# Patient Record
Sex: FEMALE | Race: WHITE | URBAN - METROPOLITAN AREA
[De-identification: names, ages, dates, MRNs, and addresses within clinical notes are randomized per-mention and may not be internally consistent; named-entity substitution may affect disease eponyms.]

---

## 2022-05-17 ENCOUNTER — OFFICE VISIT (OUTPATIENT)
Dept: URBAN - METROPOLITAN AREA CLINIC 23 | Facility: CLINIC | Age: 37
End: 2022-05-17
Payer: COMMERCIAL

## 2022-05-17 DIAGNOSIS — H43.813 VITREOUS DEGENERATION, BILATERAL: Primary | ICD-10-CM

## 2022-05-17 PROCEDURE — 99203 OFFICE O/P NEW LOW 30 MIN: CPT | Performed by: STUDENT IN AN ORGANIZED HEALTH CARE EDUCATION/TRAINING PROGRAM

## 2022-05-17 ASSESSMENT — INTRAOCULAR PRESSURE
OD: 16
OS: 17

## 2022-05-17 ASSESSMENT — KERATOMETRY
OS: 43.88
OD: 44.38

## 2022-05-17 NOTE — IMPRESSION/PLAN
Impression: Vitreous degeneration, bilateral: H43.813.
-- small ?non pigm vitreoretinal tuft inf OD Plan: Pt ed cause of floater, reassured pt no retinal breaks or detachment. Pt ed symptoms of RD/RT and to seek care immediately if further increase in floaters or if photopsia restarts. Monitor.